# Patient Record
(demographics unavailable — no encounter records)

---

## 2025-03-03 NOTE — HISTORY OF PRESENT ILLNESS
[FreeTextEntry1] : 67 years old male, born in [place] , living in  for >30 years Patient has known CKD (2022),    is here for follow up visit He is only on 2.5 mg/d Amlodipine dose decreased due to dizziness. He is exercising doing Amena.  Reports no acute symptoms. Has lost weight since on Farxiga.     He has known DM (age 40) Not on insulin; HTN (age 45). H/o Hyperlipidemia /No h/o Gout He has a coronary stent in 2018.  Past surgeries: Cataract and laser procedure No history of kidney/ bladder/ prostate surgery. Non smoker. Family:  Lives with: Wife No family history of kidney disease Independent for ADL   Functional/employment status: Retired , [employer] Wife is a  for a Bank.    Prior Studies: ultrasound- 4/2023- normal. Cardiology: [facility] Cancer Screen: Had colonoscopy Primary MD:Dr. Lopes- [facility] 10/d Metoprolol 75 mg once daily Glipizide 5 mg BID Aspirin 81 /d Rosuvastatin  Lokelma 10/d Metformin 875  BID

## 2025-03-03 NOTE — PHYSICAL EXAM
[General Appearance - Alert] : alert [General Appearance - In No Acute Distress] : in no acute distress [General Appearance - Well Nourished] : well nourished [Sclera] : the sclera and conjunctiva were normal [Outer Ear] : the ears and nose were normal in appearance [Hearing Threshold Finger Rub Not Tyrrell] : hearing was normal [Jugular Venous Distention Increased] : there was no jugular-venous distention [Exaggerated Use Of Accessory Muscles For Inspiration] : no accessory muscle use [Auscultation Breath Sounds / Voice Sounds] : lungs were clear to auscultation bilaterally [Heart Sounds] : normal S1 and S2 [Heart Sounds Pericardial Friction Rub] : no pericardial rub [Bowel Sounds] : normal bowel sounds [Abdomen Soft] : soft [Abdomen Tenderness] : non-tender [Abdomen Mass (___ Cm)] : no abdominal mass palpated [Cervical Lymph Nodes Enlarged Posterior Bilaterally] : posterior cervical [Cervical Lymph Nodes Enlarged Anterior Bilaterally] : anterior cervical [Supraclavicular Lymph Nodes Enlarged Bilaterally] : supraclavicular [No CVA Tenderness] : no ~M costovertebral angle tenderness [Abnormal Walk] : normal gait [] : no rash [No Focal Deficits] : no focal deficits [Oriented To Time, Place, And Person] : oriented to person, place, and time [Affect] : the affect was normal [Mood] : the mood was normal [FreeTextEntry1] : bilateral ankle edema noted on exam

## 2025-03-03 NOTE — ASSESSMENT
[FreeTextEntry1] : CKD Stage 3: Will repeat blood chemistry including phosphorus, uric acid, A1C. Will get repeat urinalysis and urine protein/creatinine. Noted ultrasound from 4/2023. CKD risk factors- DM, Hyperlipidemia, Hypertension, Obesity DM- Continue f/u with primary MD, will consider GLP-1r agonist HTN: Continue current regimen, controlled blood pressure. HLD: On statin, tolerating, continue current regimen Discussed current level of renal function/proteinuria and its implications. Discussed again renal function preservation strategies including: Sleep hygiene, avoiding NSAIDs and herbal medications, avoiding excessive animal protein and sodium in diet, maintaining optimized weight, blood pressure, glucose and lipids. Discussed GLP-1rAgonists, he will discuss with Dr. Lopes. F/u in 12 weeks